# Patient Record
Sex: MALE
[De-identification: names, ages, dates, MRNs, and addresses within clinical notes are randomized per-mention and may not be internally consistent; named-entity substitution may affect disease eponyms.]

---

## 2021-05-24 ENCOUNTER — APPOINTMENT (OUTPATIENT)
Dept: AFTER HOURS CARE | Facility: EMERGENCY ROOM | Age: 61
End: 2021-05-24
Payer: MEDICARE

## 2021-05-24 DIAGNOSIS — M25.421 EFFUSION, RIGHT ELBOW: ICD-10-CM

## 2021-05-24 PROCEDURE — 99204 OFFICE O/P NEW MOD 45 MIN: CPT | Mod: 95

## 2021-05-25 PROBLEM — M25.421 SWELLING OF RIGHT ELBOW: Status: ACTIVE | Noted: 2021-05-25

## 2021-06-14 NOTE — PLAN
[FreeTextEntry1] : Tylenol 500 mg QID\par Recommended sling for comfort. patient stated he preferred to use an ACE instead, which is reasonable\par Urgent orthopedic surgery f/u\par Patient understands to go to ED immediately should he develop fever/chills, malaise/flu like symptoms, for severe\par pain, or if he notices erythema anywhere on his RUE.

## 2021-06-14 NOTE — HISTORY OF PRESENT ILLNESS
[Other Location: e.g. School (Enter Location, City,State)___] : at [unfilled], at the time of the visit. [Verbal consent obtained from patient] : the patient, [unfilled] [Other Location: e.g. Home (Enter Location, City,State)___] : at [unfilled] [FreeTextEntry8] : R elbow swelling 2 days atraumatic\par Cannot flex/ext\par Elbow appears swollen\par No h/o gout, no f/c, wrist/shoulder pain; able to move all other joints\par RHD\par Ibuprofen x 2 without much relief\par NKDA\par Denies trauma, overuse, immunosuppression, IVDU, h/o DM2\par Meds: cavidolol, losartan, furosimide, plavix\par \par R elbow: No e/o erythema to skin. Joint effusion suggested but exam somewhat limited. ROM ~ 20-90. Appears to\par be have full pronation/supination. No gross olecranon bursitis\par

## 2021-06-14 NOTE — ASSESSMENT
[FreeTextEntry1] : Atraumatic R elbow effusion. Likely inflammatory, less likely hemarthrosis though possible given h/o plavix. Very\par low concern for septic joint

## 2022-01-28 ENCOUNTER — APPOINTMENT (OUTPATIENT)
Dept: CT IMAGING | Facility: CLINIC | Age: 62
End: 2022-01-28

## 2022-03-01 ENCOUNTER — APPOINTMENT (OUTPATIENT)
Dept: CT IMAGING | Facility: CLINIC | Age: 62
End: 2022-03-01
Payer: MEDICARE

## 2022-03-01 ENCOUNTER — OUTPATIENT (OUTPATIENT)
Dept: OUTPATIENT SERVICES | Facility: HOSPITAL | Age: 62
LOS: 1 days | End: 2022-03-01
Payer: COMMERCIAL

## 2022-03-01 DIAGNOSIS — M10.00 IDIOPATHIC GOUT, UNSPECIFIED SITE: ICD-10-CM

## 2022-03-01 PROCEDURE — 73200 CT UPPER EXTREMITY W/O DYE: CPT | Mod: 26,LT

## 2022-03-01 PROCEDURE — 76377 3D RENDER W/INTRP POSTPROCES: CPT | Mod: 26

## 2022-03-01 PROCEDURE — 76377 3D RENDER W/INTRP POSTPROCES: CPT

## 2022-03-01 PROCEDURE — 73200 CT UPPER EXTREMITY W/O DYE: CPT

## 2022-03-02 NOTE — H&P PST ADULT - HISTORY OF PRESENT ILLNESS
Department of Cardiology                                                                  [facility]/[place] [address] [address] [facility]                                                            Telephone: [phone]. Fax:[phone]                                                                                     Pre-BRANDON Note        Narrative:      62 yo male GAY, COPD, ETOH abuse, cocaine abuse, severe NICM with normalization of LV, HTN, HLD, PAF with prior ANI thrombus, severe VHD s/p mitral and tricuspid valve repair with ANI closure, atrial flutter s/p cardioversion 12/2020 and ablation/ANI ligation 4/2021, CAD s/p DHARA to LCx 2/2021 with Dr. Archer. He has loop recorder which has been stable.He is followed by Dr. Koch and here for BRANDON to assess ANI post op for leak.    ASA and Mallampati: Per Anesthesia    	  MEDICATIONS:  Allopurinol 100 mg daily  ASA 81 mg daily  Atorvastatin 40 mg daily  Plavix 75 mg daily  Lasix 20 mg daily  Losartan 50 mg daily  Metoprolol tartrate 25 mg daily  Percocet  Potassium 20 meq daily                    PHYSICAL EXAM:    T(C): --  HR: --  BP: --  RR: --  SpO2: --  Wt(kg): --    I&O's Summary      Daily     Daily     Constitutional: A & O x 3  HEENT:   Normal oral mucosa, PERRL, EOMI	  Cardiovascular: Normal S1 S2, No JVD, No murmurs, No edema  Respiratory: Lungs clear to auscultation	  Gastrointestinal:  Soft, Non-tender, + BS	  Skin: No rashes, No ecchymoses, No cyanosis  Neurologic: Non-focal  Extremities: Normal range of motion, No clubbing, cyanosis or edema  Vascular: Peripheral pulses palpable 2+ bilaterally    TELEMETRY: 	      ECG:  	    Diagnsotics:    Echo 4/9/21  EF 55%    LABS:	 	    CARDIAC MARKERS:                    proBNP:   Lipid Profile:   HgA1c:   TSH:     ASSESSMENT:    -BRANDON as ordered  -Labs and ECG reviewed  -Procedure discussed with patient; risks and benefits explained; questions answered  -Consent obtained by Echocardiographer and anesthesiologist                                                                         Department of Cardiology                                                                  [facility]/[place] [address] [address] [facility]                                                            Telephone: [phone]. Fax:[phone]                                                                                     Pre-BRANDON Note        Narrative:      63 yo male GAY, COPD, ETOH abuse, cocaine abuse, severe NICM with normalization of LV, HTN, HLD, PAF with prior ANI thrombus, severe VHD s/p mitral and tricuspid valve repair with ANI closure, atrial flutter s/p cardioversion 12/2020 and ablation/ANI ligation 4/2021, CAD s/p DHARA to LCx 2/2021 with Dr. Archer. He has loop recorder which has been stable. He is followed by Dr. Koch and here for BRANDON to assess ANI post op for leak.    ASA and Mallampati: Per Anesthesia    	  MEDICATIONS:  Allopurinol 100 mg daily  ASA 81 mg daily  Atorvastatin 40 mg daily  Plavix 75 mg daily  Lasix 20 mg daily  Losartan 50 mg daily  Metoprolol tartrate 25 mg daily  Percocet  Potassium 20 meq daily            Vital Signs Last 24 Hrs  T(C): 36.6 (03 Mar 2022 07:43), Max: 36.6 (03 Mar 2022 07:43)  T(F): 97.8 (03 Mar 2022 07:43), Max: 97.8 (03 Mar 2022 07:43)  HR: 82 (03 Mar 2022 07:43) (82 - 82)  BP: 166/102 (03 Mar 2022 07:43) (166/102 - 166/102)  BP(mean): --  RR: 16 (03 Mar 2022 07:43) (16 - 16)  SpO2: 96% (03 Mar 2022 07:43) (96% - 96%)        PHYSICAL EXAM:    Constitutional: A & O x 3  HEENT:   Normal oral mucosa, PERRL, EOMI	  Cardiovascular: Normal S1 S2, No JVD, No murmurs, No edema  Respiratory: Lungs clear to auscultation	  Gastrointestinal:  Soft, Non-tender, + BS	  Skin: No rashes, No ecchymoses, No cyanosis  Neurologic: Non-focal  Extremities: Normal range of motion, No clubbing, cyanosis or edema  Vascular: Peripheral pulses palpable 2+ bilaterally    TELEMETRY: SR	      ECG:  SR 85bpm without any ST changes    Diagnsotics:    Echo 4/9/21  EF 55%    LABS:	 	                          12.8   5.38  )-----------( 205      ( 03 Mar 2022 07:41 )             38.3       03-03    140  |  98  |  14.1  ----------------------------<  107<H>  3.8   |  30.0<H>  |  1.06    Ca    9.2      03 Mar 2022 07:41    TPro  7.3  /  Alb  4.2  /  TBili  0.7  /  DBili  x   /  AST  33  /  ALT  13  /  AlkPhos  181<H>  03-03                  proBNP:   Lipid Profile:   HgA1c:   TSH:     ASSESSMENT: 63 yo M with H/O MVr/TVr, ANI thrombus S/P closure presents for BRANDON to assess ANI post op for possible leak    -BRANDON as ordered  -Labs and ECG reviewed  -Procedure discussed with patient; risks and benefits explained; questions answered  -Consent obtained by Echocardiographer and anesthesiologist

## 2022-03-02 NOTE — H&P PST ADULT - NSICDXPASTSURGICALHX_GEN_ALL_CORE_FT
PAST SURGICAL HISTORY:  H/O knee surgery     S/P mitral valve repair     S/P tricuspid valve repair

## 2022-03-02 NOTE — H&P PST ADULT - NSICDXPASTMEDICALHX_GEN_ALL_CORE_FT
PAST MEDICAL HISTORY:  Afib     CAD (coronary artery disease)     COPD without exacerbation     GERD (gastroesophageal reflux disease)     Gout     HLD (hyperlipidemia)     HTN (hypertension)     NICM (nonischemic cardiomyopathy)     GAY (obstructive sleep apnea)

## 2022-03-03 ENCOUNTER — TRANSCRIPTION ENCOUNTER (OUTPATIENT)
Age: 62
End: 2022-03-03

## 2022-03-03 ENCOUNTER — OUTPATIENT (OUTPATIENT)
Dept: OUTPATIENT SERVICES | Facility: HOSPITAL | Age: 62
LOS: 1 days | End: 2022-03-03
Payer: MEDICARE

## 2022-03-03 VITALS
RESPIRATION RATE: 16 BRPM | HEART RATE: 82 BPM | OXYGEN SATURATION: 96 % | SYSTOLIC BLOOD PRESSURE: 166 MMHG | TEMPERATURE: 98 F | DIASTOLIC BLOOD PRESSURE: 102 MMHG

## 2022-03-03 VITALS — SYSTOLIC BLOOD PRESSURE: 156 MMHG | DIASTOLIC BLOOD PRESSURE: 99 MMHG

## 2022-03-03 DIAGNOSIS — Z98.890 OTHER SPECIFIED POSTPROCEDURAL STATES: Chronic | ICD-10-CM

## 2022-03-03 DIAGNOSIS — I48.91 UNSPECIFIED ATRIAL FIBRILLATION: ICD-10-CM

## 2022-03-03 LAB
ALBUMIN SERPL ELPH-MCNC: 4.2 G/DL — SIGNIFICANT CHANGE UP (ref 3.3–5.2)
ALP SERPL-CCNC: 181 U/L — HIGH (ref 40–120)
ALT FLD-CCNC: 13 U/L — SIGNIFICANT CHANGE UP
ANION GAP SERPL CALC-SCNC: 12 MMOL/L — SIGNIFICANT CHANGE UP (ref 5–17)
AST SERPL-CCNC: 33 U/L — SIGNIFICANT CHANGE UP
BASOPHILS # BLD AUTO: 0.04 K/UL — SIGNIFICANT CHANGE UP (ref 0–0.2)
BASOPHILS NFR BLD AUTO: 0.7 % — SIGNIFICANT CHANGE UP (ref 0–2)
BILIRUB SERPL-MCNC: 0.7 MG/DL — SIGNIFICANT CHANGE UP (ref 0.4–2)
BUN SERPL-MCNC: 14.1 MG/DL — SIGNIFICANT CHANGE UP (ref 8–20)
CALCIUM SERPL-MCNC: 9.2 MG/DL — SIGNIFICANT CHANGE UP (ref 8.6–10.2)
CHLORIDE SERPL-SCNC: 98 MMOL/L — SIGNIFICANT CHANGE UP (ref 98–107)
CO2 SERPL-SCNC: 30 MMOL/L — HIGH (ref 22–29)
CREAT SERPL-MCNC: 1.06 MG/DL — SIGNIFICANT CHANGE UP (ref 0.5–1.3)
EGFR: 79 ML/MIN/1.73M2 — SIGNIFICANT CHANGE UP
EOSINOPHIL # BLD AUTO: 0.18 K/UL — SIGNIFICANT CHANGE UP (ref 0–0.5)
EOSINOPHIL NFR BLD AUTO: 3.3 % — SIGNIFICANT CHANGE UP (ref 0–6)
GLUCOSE SERPL-MCNC: 107 MG/DL — HIGH (ref 70–99)
HCT VFR BLD CALC: 38.3 % — LOW (ref 39–50)
HGB BLD-MCNC: 12.8 G/DL — LOW (ref 13–17)
IMM GRANULOCYTES NFR BLD AUTO: 0.2 % — SIGNIFICANT CHANGE UP (ref 0–1.5)
LYMPHOCYTES # BLD AUTO: 1.18 K/UL — SIGNIFICANT CHANGE UP (ref 1–3.3)
LYMPHOCYTES # BLD AUTO: 21.9 % — SIGNIFICANT CHANGE UP (ref 13–44)
MCHC RBC-ENTMCNC: 32 PG — SIGNIFICANT CHANGE UP (ref 27–34)
MCHC RBC-ENTMCNC: 33.4 GM/DL — SIGNIFICANT CHANGE UP (ref 32–36)
MCV RBC AUTO: 95.8 FL — SIGNIFICANT CHANGE UP (ref 80–100)
MONOCYTES # BLD AUTO: 0.42 K/UL — SIGNIFICANT CHANGE UP (ref 0–0.9)
MONOCYTES NFR BLD AUTO: 7.8 % — SIGNIFICANT CHANGE UP (ref 2–14)
NEUTROPHILS # BLD AUTO: 3.55 K/UL — SIGNIFICANT CHANGE UP (ref 1.8–7.4)
NEUTROPHILS NFR BLD AUTO: 66.1 % — SIGNIFICANT CHANGE UP (ref 43–77)
PLATELET # BLD AUTO: 205 K/UL — SIGNIFICANT CHANGE UP (ref 150–400)
POTASSIUM SERPL-MCNC: 3.8 MMOL/L — SIGNIFICANT CHANGE UP (ref 3.5–5.3)
POTASSIUM SERPL-SCNC: 3.8 MMOL/L — SIGNIFICANT CHANGE UP (ref 3.5–5.3)
PROT SERPL-MCNC: 7.3 G/DL — SIGNIFICANT CHANGE UP (ref 6.6–8.7)
RBC # BLD: 4 M/UL — LOW (ref 4.2–5.8)
RBC # FLD: 14.1 % — SIGNIFICANT CHANGE UP (ref 10.3–14.5)
SODIUM SERPL-SCNC: 140 MMOL/L — SIGNIFICANT CHANGE UP (ref 135–145)
WBC # BLD: 5.38 K/UL — SIGNIFICANT CHANGE UP (ref 3.8–10.5)
WBC # FLD AUTO: 5.38 K/UL — SIGNIFICANT CHANGE UP (ref 3.8–10.5)

## 2022-03-03 PROCEDURE — 85025 COMPLETE CBC W/AUTO DIFF WBC: CPT

## 2022-03-03 PROCEDURE — 80053 COMPREHEN METABOLIC PANEL: CPT

## 2022-03-03 PROCEDURE — 93325 DOPPLER ECHO COLOR FLOW MAPG: CPT

## 2022-03-03 PROCEDURE — 93010 ELECTROCARDIOGRAM REPORT: CPT

## 2022-03-03 PROCEDURE — 93320 DOPPLER ECHO COMPLETE: CPT

## 2022-03-03 PROCEDURE — 93312 ECHO TRANSESOPHAGEAL: CPT

## 2022-03-03 PROCEDURE — 36415 COLL VENOUS BLD VENIPUNCTURE: CPT

## 2022-03-03 PROCEDURE — 93005 ELECTROCARDIOGRAM TRACING: CPT

## 2022-03-03 RX ORDER — POTASSIUM CHLORIDE 20 MEQ
1 PACKET (EA) ORAL
Qty: 0 | Refills: 0 | DISCHARGE

## 2022-03-03 RX ORDER — FUROSEMIDE 40 MG
1 TABLET ORAL
Qty: 0 | Refills: 0 | DISCHARGE

## 2022-03-03 RX ORDER — LOSARTAN POTASSIUM 100 MG/1
1 TABLET, FILM COATED ORAL
Qty: 0 | Refills: 0 | DISCHARGE

## 2022-03-03 RX ORDER — COLCHICINE 0.6 MG
1 TABLET ORAL
Qty: 0 | Refills: 0 | DISCHARGE

## 2022-03-03 RX ORDER — METOPROLOL TARTRATE 50 MG
1 TABLET ORAL
Qty: 0 | Refills: 0 | DISCHARGE

## 2022-03-03 RX ORDER — ATORVASTATIN CALCIUM 80 MG/1
1 TABLET, FILM COATED ORAL
Qty: 0 | Refills: 0 | DISCHARGE

## 2022-03-03 RX ORDER — ASPIRIN/CALCIUM CARB/MAGNESIUM 324 MG
1 TABLET ORAL
Qty: 0 | Refills: 0 | DISCHARGE

## 2022-03-03 RX ORDER — ALLOPURINOL 300 MG
1 TABLET ORAL
Qty: 0 | Refills: 0 | DISCHARGE

## 2022-03-03 NOTE — DISCHARGE NOTE PROVIDER - NSDCMRMEDTOKEN_GEN_ALL_CORE_FT
allopurinol 300 mg oral tablet: 1 tab(s) orally once a day  aspirin 81 mg oral tablet, chewable: 1 tab(s) orally once a day  atorvastatin 40 mg oral tablet: 1 tab(s) orally once a day  colchicine 0.6 mg oral tablet: 1 tab(s) orally once a day  furosemide 20 mg oral tablet: 1 tab(s) orally once a day  losartan 50 mg oral tablet: 1 tab(s) orally once a day  Metoprolol Tartrate 25 mg oral tablet: 1 tab(s) orally 2 times a day  potassium chloride 20 mEq oral tablet, extended release: 1 tab(s) orally 2 times a day

## 2022-03-03 NOTE — DISCHARGE NOTE NURSING/CASE MANAGEMENT/SOCIAL WORK - NSDCPEFALRISK_GEN_ALL_CORE
For information on Fall & Injury Prevention, visit: [email or web address] OR  [email or web address] OR  https://www.cdc.gov/steadi/patient.html

## 2022-03-03 NOTE — DISCHARGE NOTE PROVIDER - NSDCCPTREATMENT_GEN_ALL_CORE_FT
PRINCIPAL PROCEDURE  Procedure: Complete transesophageal echocardiography (BRANDON) with color flow Doppler imaging  Findings and Treatment: No sig mitral or tricuspid regurgitation; ANI ligation without leak

## 2022-03-03 NOTE — DISCHARGE NOTE NURSING/CASE MANAGEMENT/SOCIAL WORK - PATIENT PORTAL LINK FT
You can access the FollowMyHealth Patient Portal offered by [facility] by registering at the following website: [email or web address]. By joining [facility]’s FollowMyHealth portal, you will also be able to view your health information using other applications (apps) compatible with our system.

## 2022-03-03 NOTE — DISCHARGE NOTE PROVIDER - CARE PROVIDER_API CALL
Tc Gorman)  Cardiology; Internal Medicine  [address] [place] [address]  Phone: [phone]  Fax: [phone]  Follow Up Time: 2 weeks

## 2022-03-03 NOTE — DISCHARGE NOTE PROVIDER - HOSPITAL COURSE
63 yo male GAY, COPD, ETOH abuse, cocaine abuse, severe NICM with normalization of LV, HTN, HLD, PAF with prior ANI thrombus, severe VHD s/p mitral and tricuspid valve repair with ANI closure, atrial flutter s/p cardioversion 12/2020 and ablation/ANI ligation 4/2021, CAD s/p DHARA to LCx 2/2021 with Dr. Archer. He has loop recorder which has been stable. He is followed by Dr. Koch and here for BRANDON to assess ANI post op for leak.  S/P BRANDON with Dr Gorman which did not demonstrate and sig MR or TR, nor ANI thrombus  Tolerated the procedure well	  Stable for discharge home

## 2022-03-03 NOTE — ASU PATIENT PROFILE, ADULT - FALL HARM RISK - UNIVERSAL INTERVENTIONS
Bed in lowest position, wheels locked, appropriate side rails in place/Call bell, personal items and telephone in reach/Instruct patient to call for assistance before getting out of bed or chair/Non-slip footwear when patient is out of bed/[place] to call system/Physically safe environment - no spills, clutter or unnecessary equipment/Purposeful Proactive Rounding/Room/bathroom lighting operational, light cord in reach

## 2022-03-03 NOTE — PROGRESS NOTE ADULT - SUBJECTIVE AND OBJECTIVE BOX
Post-Procedure Note: BRANDON    61 yo male GAY, COPD, ETOH abuse, cocaine abuse, severe NICM with normalization of LV, HTN, HLD, PAF with prior ANI thrombus, severe VHD s/p mitral and tricuspid valve repair with ANI closure, atrial flutter s/p cardioversion 12/2020 and ablation/ANI ligation 4/2021, CAD s/p DHARA to LCx 2/2021 with Dr. Archer. He has loop recorder which has been stable. He is followed by Dr. Koch and here for BRANDON to assess ANI post op for leak.  S/P BRANDON with Dr Gorman which did not demonstrate and sig MR or TR, nor ANI thrombus  Tolerated the procedure well      T(C): 36.6 (03-03-22 @ 07:43), Max: 36.6 (03-03-22 @ 07:43)  HR: 82 (03-03-22 @ 07:43) (82 - 82)  BP: 156/99 (03-03-22 @ 08:00) (156/99 - 166/102)  RR: 16 (03-03-22 @ 07:43) (16 - 16)  SpO2: 96% (03-03-22 @ 07:43) (96% - 96%)  Wt(kg): --      Constitutional: A & O x 3  HEENT:   Normal oral mucosa, PERRL, EOMI	  Cardiovascular: Normal S1 S2, No JVD, No murmurs, No edema  Respiratory: Lungs clear to auscultation	  Gastrointestinal:  Soft, Non-tender, + BS	  Skin: No rashes, No ecchymoses, No cyanosis  Neurologic: Non-focal  Extremities: Normal range of motion, No clubbing, cyanosis or edema  Vascular: Peripheral pulses palpable 2+ bilaterally        DIAGNOSTIC TESTING:  [ ] Echocardiogram:     ASSESSMENT: 61 y/o M S/P MVr/TVr, ANI closure S/P BRANDON    -Post BRANDON orders  -preliminary verbal report; centricity pending  -Cont medical therapy  -Follow up with Dr Gorman as outpt  -DC home after recovery period completed                                                                                                                                               Post-Procedure Note: BRANDON    63 yo male GAY, COPD, ETOH abuse, cocaine abuse, severe NICM with normalization of LV, HTN, HLD, PAF with prior ANI thrombus, severe VHD s/p mitral and tricuspid valve repair with ANI closure, atrial flutter s/p cardioversion 12/2020 and ablation/ANI ligation 4/2021, CAD s/p DHARA to LCx 2/2021 with Dr. Archer. He has loop recorder which has been stable. He is followed by Dr. Koch and here for BRANDON to assess ANI post op for leak.  S/P BRANDON with Dr Gorman which did not demonstrate and sig MR or TR, nor ANI thrombus  Tolerated the procedure well    < from: BRANDON Echo Doppler (03.03.22 @ 09:31) >  ACC: [id number] EXAM:  BRANDON COMP W SPECT AND COLOR#                          PROCEDURE DATE:  03/03/2022          INTERPRETATION:  TRANSESOPHAGEAL ECHOCARDIOGRAM REPORT        Patient Name:   ISA MATAMOROS Patient Location: Outpatient  Medical Rec #:  [id number]   Accession #:      [id number]  Account #:                 Height:           73.0 in 185.4 cm  YOB: 1960  Weight:           210.0 lb 95.26 kg  Patient Age:    62 years   BSA:              2.20 m²  Patient Gender: M          BP:           130/82 mmHg      Date of Exam:        3/3/2022 9:31:13 AM  Sonographer:         Constantin Kim  Referring Physician: LEX    Procedure:   Transesophageal Echocardiogram.  Indications: Other nonrheumatic mitral valve disorders - I34.8  Diagnosis:   Other nonrheumatic mitral valve disorders - I34.8    SPECTRAL DOPPLER ANALYSIS (where applicable):    PHYSICIAN INTERPRETATION:  Left Ventricle:  Left ventricular ejection fraction, by visual estimation, is 50 to 55%.  Right Ventricle: Theright ventricular size is normal. RV systolic   function is normal.  Left Atrium: Left atrial enlargement. Status-post ANI ligation without   any evidence of leak. Intact intra-atrial septum without shunt.  Right Atrium: Mildly enlarged right atrium.  Mitral Valve: Status-post mitral annular ring insertion. Trace mitral   valve regurgitation is seen.  Tricuspid Valve: No tricuspid regurgitation is visualized. Status-post   tricuspid annular repair with normal function.  Aortic Valve: The aortic valve is trileaflet. No evidence of aortic   stenosis. No evidence of aortic valve regurgitation is seen.  Pulmonic Valve: The pulmonic valve was not well visualized.  Aorta: There is mild aortic root calcification.  Shunts: There is no evidence of a patent foramen ovale.      Summary:   1. Status-post ANI ligation without any evidence of leak.   2. Left ventricular ejection fraction, by visual estimation, is 50 to   55%.   3. Left atrial enlargement.   4. Mildly enlarged right atrium.   5. Status-post mitral annular ring insertion.   6. Trace mitral valve regurgitation.   7. Status-post tricuspid annular repair with normal function.   8. Intact intra-atrial septum without shunt.   9. No evidence of aortic stenosis.  10. There is mild aortic root calcification.         T(C): 36.6 (03-03-22 @ 07:43), Max: 36.6 (03-03-22 @ 07:43)  HR: 82 (03-03-22 @ 07:43) (82 - 82)  BP: 156/99 (03-03-22 @ 08:00) (156/99 - 166/102)  RR: 16 (03-03-22 @ 07:43) (16 - 16)  SpO2: 96% (03-03-22 @ 07:43) (96% - 96%)  Wt(kg): --      Constitutional: A & O x 3  HEENT:   Normal oral mucosa, PERRL, EOMI	  Cardiovascular: Normal S1 S2, No JVD, No murmurs, No edema  Respiratory: Lungs clear to auscultation	  Gastrointestinal:  Soft, Non-tender, + BS	  Skin: No rashes, No ecchymoses, No cyanosis  Neurologic: Non-focal  Extremities: Normal range of motion, No clubbing, cyanosis or edema  Vascular: Peripheral pulses palpable 2+ bilaterally        DIAGNOSTIC TESTING:  [ ] Echocardiogram:     ASSESSMENT: 63 y/o M S/P MVr/TVr, ANI closure S/P BRANDON    -Post BRANDON orders  -Cont medical therapy  -Follow up with Dr Lex ford outpt  -DC home after recovery period completed

## 2024-09-25 ENCOUNTER — OFFICE (OUTPATIENT)
Dept: URBAN - METROPOLITAN AREA CLINIC 63 | Facility: CLINIC | Age: 64
Setting detail: OPHTHALMOLOGY
End: 2024-09-25
Payer: MEDICARE

## 2024-09-25 DIAGNOSIS — H52.4: ICD-10-CM

## 2024-09-25 DIAGNOSIS — H35.033: ICD-10-CM

## 2024-09-25 DIAGNOSIS — H25.13: ICD-10-CM

## 2024-09-25 DIAGNOSIS — H02.89: ICD-10-CM

## 2024-09-25 PROCEDURE — 92004 COMPRE OPH EXAM NEW PT 1/>: CPT | Performed by: INTERNAL MEDICINE

## 2024-09-25 PROCEDURE — 92250 FUNDUS PHOTOGRAPHY W/I&R: CPT | Performed by: INTERNAL MEDICINE

## 2024-09-25 ASSESSMENT — CONFRONTATIONAL VISUAL FIELD TEST (CVF)
OS_FINDINGS: FULL
OD_FINDINGS: FULL

## 2025-03-04 PROBLEM — E78.5 HYPERLIPIDEMIA, UNSPECIFIED: Chronic | Status: ACTIVE | Noted: 2022-03-03

## 2025-03-04 PROBLEM — K21.9 GASTRO-ESOPHAGEAL REFLUX DISEASE WITHOUT ESOPHAGITIS: Chronic | Status: ACTIVE | Noted: 2022-03-03

## 2025-03-04 PROBLEM — J44.9 CHRONIC OBSTRUCTIVE PULMONARY DISEASE, UNSPECIFIED: Chronic | Status: ACTIVE | Noted: 2022-03-03

## 2025-03-04 PROBLEM — I42.8 OTHER CARDIOMYOPATHIES: Chronic | Status: ACTIVE | Noted: 2022-03-03

## 2025-03-04 PROBLEM — I10 ESSENTIAL (PRIMARY) HYPERTENSION: Chronic | Status: ACTIVE | Noted: 2022-03-03

## 2025-03-04 PROBLEM — M10.9 GOUT, UNSPECIFIED: Chronic | Status: ACTIVE | Noted: 2022-03-03

## 2025-03-04 PROBLEM — I25.10 ATHEROSCLEROTIC HEART DISEASE OF NATIVE CORONARY ARTERY WITHOUT ANGINA PECTORIS: Chronic | Status: ACTIVE | Noted: 2022-03-03

## 2025-03-04 PROBLEM — I48.91 UNSPECIFIED ATRIAL FIBRILLATION: Chronic | Status: ACTIVE | Noted: 2022-03-03

## 2025-03-04 PROBLEM — G47.33 OBSTRUCTIVE SLEEP APNEA (ADULT) (PEDIATRIC): Chronic | Status: ACTIVE | Noted: 2022-03-03

## 2025-03-26 ENCOUNTER — RX ONLY (RX ONLY)
Age: 65
End: 2025-03-26

## 2025-03-26 ENCOUNTER — OFFICE (OUTPATIENT)
Dept: URBAN - METROPOLITAN AREA CLINIC 63 | Facility: CLINIC | Age: 65
Setting detail: OPHTHALMOLOGY
End: 2025-03-26
Payer: MEDICARE

## 2025-03-26 DIAGNOSIS — H02.834: ICD-10-CM

## 2025-03-26 DIAGNOSIS — H35.033: ICD-10-CM

## 2025-03-26 DIAGNOSIS — H02.89: ICD-10-CM

## 2025-03-26 DIAGNOSIS — H02.831: ICD-10-CM

## 2025-03-26 DIAGNOSIS — H16.223: ICD-10-CM

## 2025-03-26 DIAGNOSIS — H25.813: ICD-10-CM

## 2025-03-26 PROCEDURE — 92014 COMPRE OPH EXAM EST PT 1/>: CPT | Performed by: INTERNAL MEDICINE

## 2025-03-26 ASSESSMENT — REFRACTION_MANIFEST
OS_AXIS: 065
OU_VA: 20/20
OD_CYLINDER: -0.50
OS_SPHERE: PLANO
OD_VA2: 20/20(J1+)
OS_CYLINDER: -1.00
OS_VA2: 20/20(J1+)
OS_VA1: 20/20
OS_SPHERE: 0.00
OD_SPHERE: -2.50
OD_VA1: 20/50
OS_CYLINDER: -1.00
OD_CYLINDER: -0.75
OD_ADD: +2.25
OD_SPHERE: -2.50
OS_AXIS: 065
OD_VA1: 20/25-1
OS_ADD: +2.25
OS_VA1: 20/20
OD_AXIS: 085
OD_AXIS: 090

## 2025-03-26 ASSESSMENT — SUPERFICIAL PUNCTATE KERATITIS (SPK)
OS_SPK: 1+
OD_SPK: 1+

## 2025-03-26 ASSESSMENT — REFRACTION_AUTOREFRACTION
OD_SPHERE: -2.50
OS_SPHERE: +0.25
OD_CYLINDER: -0.75
OS_CYLINDER: -1.00
OD_AXIS: 093
OS_AXIS: 067

## 2025-03-26 ASSESSMENT — LID POSITION - DERMATOCHALASIS
OS_DERMATOCHALASIS: LUL 2+
OD_DERMATOCHALASIS: RUL 2+

## 2025-03-26 ASSESSMENT — VISUAL ACUITY
OD_BCVA: 20/20-1
OS_BCVA: 20/300

## 2025-03-26 ASSESSMENT — KERATOMETRY
OD_K1POWER_DIOPTERS: 42.75
OS_K2POWER_DIOPTERS: 42.25
OD_AXISANGLE_DEGREES: 173
OS_K1POWER_DIOPTERS: 42.00
OD_K2POWER_DIOPTERS: 43.00
OS_AXISANGLE_DEGREES: 154

## 2025-03-26 ASSESSMENT — CONFRONTATIONAL VISUAL FIELD TEST (CVF)
OS_FINDINGS: FULL
OD_FINDINGS: FULL

## 2025-03-26 ASSESSMENT — TONOMETRY
OD_IOP_MMHG: 20
OD_IOP_MMHG: 19
OS_IOP_MMHG: 19

## 2025-04-17 ENCOUNTER — APPOINTMENT (OUTPATIENT)
Dept: PULMONOLOGY | Facility: CLINIC | Age: 65
End: 2025-04-17
Payer: MEDICARE

## 2025-04-17 VITALS
HEIGHT: 69.75 IN | BODY MASS INDEX: 32.87 KG/M2 | DIASTOLIC BLOOD PRESSURE: 68 MMHG | OXYGEN SATURATION: 97 % | WEIGHT: 227 LBS | RESPIRATION RATE: 16 BRPM | SYSTOLIC BLOOD PRESSURE: 116 MMHG | HEART RATE: 62 BPM

## 2025-04-17 DIAGNOSIS — E66.9 OBESITY, UNSPECIFIED: ICD-10-CM

## 2025-04-17 DIAGNOSIS — I10 ESSENTIAL (PRIMARY) HYPERTENSION: ICD-10-CM

## 2025-04-17 DIAGNOSIS — G47.33 OBSTRUCTIVE SLEEP APNEA (ADULT) (PEDIATRIC): ICD-10-CM

## 2025-04-17 DIAGNOSIS — E78.00 PURE HYPERCHOLESTEROLEMIA, UNSPECIFIED: ICD-10-CM

## 2025-04-17 PROCEDURE — G2211 COMPLEX E/M VISIT ADD ON: CPT

## 2025-04-17 PROCEDURE — 99204 OFFICE O/P NEW MOD 45 MIN: CPT

## 2025-04-17 RX ORDER — FUROSEMIDE 80 MG/1
TABLET ORAL
Refills: 0 | Status: ACTIVE | COMMUNITY

## 2025-04-17 RX ORDER — LOSARTAN POTASSIUM 100 MG/1
TABLET, FILM COATED ORAL
Refills: 0 | Status: ACTIVE | COMMUNITY

## 2025-04-17 RX ORDER — ATORVASTATIN CALCIUM 80 MG/1
TABLET, FILM COATED ORAL
Refills: 0 | Status: ACTIVE | COMMUNITY

## 2025-04-23 ENCOUNTER — OFFICE (OUTPATIENT)
Dept: URBAN - METROPOLITAN AREA CLINIC 63 | Facility: CLINIC | Age: 65
Setting detail: OPHTHALMOLOGY
End: 2025-04-23
Payer: MEDICARE

## 2025-04-23 DIAGNOSIS — H02.834: ICD-10-CM

## 2025-04-23 DIAGNOSIS — H02.831: ICD-10-CM

## 2025-04-23 DIAGNOSIS — H16.223: ICD-10-CM

## 2025-04-23 DIAGNOSIS — H25.813: ICD-10-CM

## 2025-04-23 DIAGNOSIS — H25.811: ICD-10-CM

## 2025-04-23 PROCEDURE — 92136 OPHTHALMIC BIOMETRY: CPT | Mod: RT | Performed by: INTERNAL MEDICINE

## 2025-04-23 PROCEDURE — 99213 OFFICE O/P EST LOW 20 MIN: CPT | Performed by: INTERNAL MEDICINE

## 2025-04-23 ASSESSMENT — KERATOMETRY
OD_AXISANGLE_DEGREES: 106
OS_AXISANGLE_DEGREES: 52
OS_AXISANGLE_DEGREES: 142
OD_AXISANGLE2_DEGREES: 016
OD_K1K2_AVERAGE: 42.625
OS_CYLPOWER_DEGREES: 0.5
OD_K1POWER_DIOPTERS: 42.50
OD_K2POWER_DIOPTERS: 42.75
OD_CYLAXISANGLE_DEGREES: 016
OS_CYLAXISANGLE_DEGREES: 142
OS_K1POWER_DIOPTERS: 42.50
OS_K2POWER_DIOPTERS: 43.00
OS_K2POWER_DIOPTERS: 43.00
OS_AXISANGLE2_DEGREES: 142
OD_K2POWER_DIOPTERS: 42.75
OD_CYLPOWER_DEGREES: 0.25
OD_K1POWER_DIOPTERS: 42.50
OS_K1POWER_DIOPTERS: 42.50
OS_K1K2_AVERAGE: 42.75
OD_AXISANGLE_DEGREES: 016

## 2025-04-23 ASSESSMENT — LID POSITION - DERMATOCHALASIS
OD_DERMATOCHALASIS: RUL 2+
OS_DERMATOCHALASIS: LUL 2+

## 2025-04-23 ASSESSMENT — REFRACTION_MANIFEST
OS_AXIS: 065
OD_AXIS: 090
OS_ADD: +2.25
OS_VA1: 20/20
OS_CYLINDER: -1.00
OD_VA1: 20/50
OS_AXIS: 065
OS_SPHERE: PLANO
OD_CYLINDER: -0.50
OS_CYLINDER: -1.00
OD_VA2: 20/20(J1+)
OD_VA1: 20/25-1
OS_VA2: 20/20(J1+)
OS_SPHERE: 0.00
OD_CYLINDER: -0.75
OS_VA1: 20/20
OD_ADD: +2.25
OU_VA: 20/20
OD_SPHERE: -2.50
OD_SPHERE: -2.50
OD_AXIS: 085

## 2025-04-23 ASSESSMENT — VISUAL ACUITY
OD_BCVA: 20/30
OS_BCVA: 20/400

## 2025-04-23 ASSESSMENT — REFRACTION_AUTOREFRACTION
OS_CYLINDER: -0.50
OD_AXIS: 088
OD_CYLINDER: -1.00
OD_SPHERE: -2.50
OS_AXIS: 060
OS_SPHERE: -0.25

## 2025-04-23 ASSESSMENT — SUPERFICIAL PUNCTATE KERATITIS (SPK)
OD_SPK: 1+
OS_SPK: 1+

## 2025-04-23 ASSESSMENT — TONOMETRY: OD_IOP_MMHG: 18

## 2025-04-23 ASSESSMENT — CONFRONTATIONAL VISUAL FIELD TEST (CVF)
OS_FINDINGS: FULL
OD_FINDINGS: FULL

## 2025-06-03 ENCOUNTER — OUTPATIENT (OUTPATIENT)
Dept: OUTPATIENT SERVICES | Facility: HOSPITAL | Age: 65
LOS: 1 days | End: 2025-06-03
Payer: MEDICARE

## 2025-06-03 DIAGNOSIS — Z98.890 OTHER SPECIFIED POSTPROCEDURAL STATES: Chronic | ICD-10-CM

## 2025-06-03 DIAGNOSIS — G47.33 OBSTRUCTIVE SLEEP APNEA (ADULT) (PEDIATRIC): ICD-10-CM

## 2025-06-03 PROCEDURE — 95810 POLYSOM 6/> YRS 4/> PARAM: CPT

## 2025-06-03 PROCEDURE — 95811 POLYSOM 6/>YRS CPAP 4/> PARM: CPT | Mod: 26

## 2025-06-24 ENCOUNTER — APPOINTMENT (OUTPATIENT)
Dept: PULMONOLOGY | Facility: CLINIC | Age: 65
End: 2025-06-24
Payer: MEDICARE

## 2025-06-24 VITALS
SYSTOLIC BLOOD PRESSURE: 118 MMHG | WEIGHT: 225 LBS | DIASTOLIC BLOOD PRESSURE: 72 MMHG | RESPIRATION RATE: 16 BRPM | BODY MASS INDEX: 29.82 KG/M2 | OXYGEN SATURATION: 97 % | HEART RATE: 68 BPM | HEIGHT: 73 IN

## 2025-06-24 PROCEDURE — 99214 OFFICE O/P EST MOD 30 MIN: CPT

## 2025-06-24 PROCEDURE — G2211 COMPLEX E/M VISIT ADD ON: CPT

## 2025-06-24 RX ORDER — ALLOPURINOL 200 MG/1
TABLET ORAL
Refills: 0 | Status: ACTIVE | COMMUNITY

## 2025-06-24 RX ORDER — ASPIRIN 81 MG
81 TABLET,CHEWABLE ORAL
Refills: 0 | Status: ACTIVE | COMMUNITY

## 2025-06-25 ENCOUNTER — OFFICE (OUTPATIENT)
Dept: URBAN - METROPOLITAN AREA CLINIC 104 | Facility: CLINIC | Age: 65
Setting detail: OPHTHALMOLOGY
End: 2025-06-25
Payer: MEDICARE

## 2025-06-25 DIAGNOSIS — Z01.818: ICD-10-CM

## 2025-06-25 DIAGNOSIS — H25.813: ICD-10-CM

## 2025-06-25 PROCEDURE — 99212 OFFICE O/P EST SF 10 MIN: CPT

## 2025-07-02 ENCOUNTER — ASC (OUTPATIENT)
Dept: URBAN - METROPOLITAN AREA SURGERY 8 | Facility: SURGERY | Age: 65
Setting detail: OPHTHALMOLOGY
End: 2025-07-02
Payer: MEDICARE

## 2025-07-02 DIAGNOSIS — H25.11: ICD-10-CM

## 2025-07-02 PROCEDURE — 66984 XCAPSL CTRC RMVL W/O ECP: CPT | Mod: RT | Performed by: INTERNAL MEDICINE

## 2025-07-03 ENCOUNTER — OFFICE (OUTPATIENT)
Dept: URBAN - METROPOLITAN AREA CLINIC 63 | Facility: CLINIC | Age: 65
Setting detail: OPHTHALMOLOGY
End: 2025-07-03
Payer: MEDICARE

## 2025-07-03 DIAGNOSIS — Z96.1: ICD-10-CM

## 2025-07-03 PROCEDURE — 99024 POSTOP FOLLOW-UP VISIT: CPT | Performed by: INTERNAL MEDICINE

## 2025-07-03 ASSESSMENT — VISUAL ACUITY
OD_BCVA: 20/25-1
OS_BCVA: 20/30

## 2025-07-03 ASSESSMENT — SUPERFICIAL PUNCTATE KERATITIS (SPK)
OD_SPK: 1+
OS_SPK: 1+

## 2025-07-03 ASSESSMENT — CONFRONTATIONAL VISUAL FIELD TEST (CVF)
OD_FINDINGS: FULL
OS_FINDINGS: FULL

## 2025-07-03 ASSESSMENT — TONOMETRY
OS_IOP_MMHG: 15
OD_IOP_MMHG: 15

## 2025-07-03 ASSESSMENT — LID POSITION - DERMATOCHALASIS
OS_DERMATOCHALASIS: LUL 2+
OD_DERMATOCHALASIS: RUL 2+

## 2025-07-03 ASSESSMENT — CORNEAL EDEMA - FOLDS/STRIAE: OD_FOLDSSTRIAE: T

## 2025-07-08 ENCOUNTER — NON-APPOINTMENT (OUTPATIENT)
Age: 65
End: 2025-07-08

## 2025-07-10 ENCOUNTER — APPOINTMENT (OUTPATIENT)
Dept: OTOLARYNGOLOGY | Facility: CLINIC | Age: 65
End: 2025-07-10
Payer: MEDICARE

## 2025-07-10 ENCOUNTER — OFFICE (OUTPATIENT)
Dept: URBAN - METROPOLITAN AREA CLINIC 63 | Facility: CLINIC | Age: 65
Setting detail: OPHTHALMOLOGY
End: 2025-07-10
Payer: MEDICARE

## 2025-07-10 ENCOUNTER — RX ONLY (RX ONLY)
Age: 65
End: 2025-07-10

## 2025-07-10 VITALS
DIASTOLIC BLOOD PRESSURE: 75 MMHG | BODY MASS INDEX: 29.82 KG/M2 | HEIGHT: 73 IN | HEART RATE: 116 BPM | SYSTOLIC BLOOD PRESSURE: 111 MMHG | WEIGHT: 225 LBS

## 2025-07-10 DIAGNOSIS — H25.812: ICD-10-CM

## 2025-07-10 DIAGNOSIS — Z96.1: ICD-10-CM

## 2025-07-10 PROBLEM — G62.9 NEUROPATHY: Status: ACTIVE | Noted: 2025-07-10

## 2025-07-10 PROBLEM — M06.9 RHEUMATOID ARTHRITIS: Status: ACTIVE | Noted: 2025-07-10

## 2025-07-10 PROBLEM — C61 PROSTATE CA: Status: ACTIVE | Noted: 2025-07-10

## 2025-07-10 PROBLEM — M10.9 GOUT: Status: ACTIVE | Noted: 2025-07-10

## 2025-07-10 PROCEDURE — 99204 OFFICE O/P NEW MOD 45 MIN: CPT

## 2025-07-10 PROCEDURE — 99024 POSTOP FOLLOW-UP VISIT: CPT | Performed by: INTERNAL MEDICINE

## 2025-07-10 RX ORDER — SILDENAFIL 20 MG/1
TABLET ORAL
Refills: 0 | Status: ACTIVE | COMMUNITY

## 2025-07-10 RX ORDER — POTASSIUM CHLORIDE 10 MEQ
CAPSULE, EXTENDED RELEASE ORAL
Refills: 0 | Status: ACTIVE | COMMUNITY

## 2025-07-10 RX ORDER — TEMAZEPAM 22.5 MG/1
CAPSULE ORAL
Refills: 0 | Status: ACTIVE | COMMUNITY

## 2025-07-10 ASSESSMENT — LID POSITION - DERMATOCHALASIS
OS_DERMATOCHALASIS: LUL 2+
OD_DERMATOCHALASIS: RUL 2+

## 2025-07-10 ASSESSMENT — CONFRONTATIONAL VISUAL FIELD TEST (CVF)
OS_FINDINGS: FULL
OD_FINDINGS: FULL

## 2025-07-10 ASSESSMENT — SUPERFICIAL PUNCTATE KERATITIS (SPK)
OS_SPK: 1+
OD_SPK: 1+

## 2025-07-10 ASSESSMENT — VISUAL ACUITY
OS_BCVA: 20/20-
OD_BCVA: 20/30

## 2025-07-10 ASSESSMENT — TONOMETRY
OS_IOP_MMHG: 20
OD_IOP_MMHG: 15

## 2025-07-10 ASSESSMENT — CORNEAL EDEMA - FOLDS/STRIAE: OD_FOLDSSTRIAE: ABSENT

## 2025-07-16 ENCOUNTER — APPOINTMENT (OUTPATIENT)
Dept: NUCLEAR MEDICINE | Facility: CLINIC | Age: 65
End: 2025-07-16
Payer: MEDICARE

## 2025-07-16 ENCOUNTER — OUTPATIENT (OUTPATIENT)
Dept: OUTPATIENT SERVICES | Facility: HOSPITAL | Age: 65
LOS: 1 days | End: 2025-07-16

## 2025-07-16 DIAGNOSIS — Z98.890 OTHER SPECIFIED POSTPROCEDURAL STATES: Chronic | ICD-10-CM

## 2025-07-16 DIAGNOSIS — C61 MALIGNANT NEOPLASM OF PROSTATE: ICD-10-CM

## 2025-07-16 PROCEDURE — 78816 PET IMAGE W/CT FULL BODY: CPT | Mod: 26

## 2025-08-05 ENCOUNTER — OUTPATIENT (OUTPATIENT)
Dept: OUTPATIENT SERVICES | Facility: HOSPITAL | Age: 65
LOS: 1 days | End: 2025-08-05
Payer: MEDICARE

## 2025-08-05 ENCOUNTER — APPOINTMENT (OUTPATIENT)
Dept: RADIOLOGY | Facility: CLINIC | Age: 65
End: 2025-08-05

## 2025-08-05 DIAGNOSIS — Z13.820 ENCOUNTER FOR SCREENING FOR OSTEOPOROSIS: ICD-10-CM

## 2025-08-05 DIAGNOSIS — Z98.890 OTHER SPECIFIED POSTPROCEDURAL STATES: Chronic | ICD-10-CM

## 2025-08-05 PROCEDURE — 77080 DXA BONE DENSITY AXIAL: CPT | Mod: 26

## 2025-08-05 PROCEDURE — 77080 DXA BONE DENSITY AXIAL: CPT

## 2025-08-06 ENCOUNTER — ASC (OUTPATIENT)
Dept: URBAN - METROPOLITAN AREA SURGERY 8 | Facility: SURGERY | Age: 65
Setting detail: OPHTHALMOLOGY
End: 2025-08-06
Payer: MEDICARE

## 2025-08-06 DIAGNOSIS — H52.222: ICD-10-CM

## 2025-08-06 DIAGNOSIS — H25.12: ICD-10-CM

## 2025-08-06 PROCEDURE — 66984 XCAPSL CTRC RMVL W/O ECP: CPT | Mod: 79,LT | Performed by: INTERNAL MEDICINE

## 2025-08-06 PROCEDURE — FEMTO PRECISION LASER CATARACT SURGERY: Mod: GY,LT | Performed by: INTERNAL MEDICINE

## 2025-08-07 ENCOUNTER — OFFICE (OUTPATIENT)
Dept: URBAN - METROPOLITAN AREA CLINIC 63 | Facility: CLINIC | Age: 65
Setting detail: OPHTHALMOLOGY
End: 2025-08-07
Payer: MEDICARE

## 2025-08-07 ENCOUNTER — APPOINTMENT (OUTPATIENT)
Dept: MRI IMAGING | Facility: CLINIC | Age: 65
End: 2025-08-07
Payer: MEDICARE

## 2025-08-07 DIAGNOSIS — Z96.1: ICD-10-CM

## 2025-08-07 PROCEDURE — 99024 POSTOP FOLLOW-UP VISIT: CPT | Performed by: INTERNAL MEDICINE

## 2025-08-07 PROCEDURE — 76498P: CUSTOM

## 2025-08-07 PROCEDURE — A9585: CPT | Mod: JZ

## 2025-08-07 PROCEDURE — 72197 MRI PELVIS W/O & W/DYE: CPT

## 2025-08-07 ASSESSMENT — KERATOMETRY
OS_K2POWER_DIOPTERS: 43.00
OD_AXISANGLE_DEGREES: 090
OS_K1POWER_DIOPTERS: 41.75
OS_AXISANGLE_DEGREES: 117
OD_K2POWER_DIOPTERS: 42.75
OD_K1POWER_DIOPTERS: 42.75

## 2025-08-07 ASSESSMENT — SUPERFICIAL PUNCTATE KERATITIS (SPK)
OD_SPK: 1+
OS_SPK: 1+

## 2025-08-07 ASSESSMENT — TONOMETRY
OD_IOP_MMHG: 19
OS_IOP_MMHG: 19
OD_IOP_MMHG: 18

## 2025-08-07 ASSESSMENT — CORNEAL EDEMA - FOLDS/STRIAE
OD_FOLDSSTRIAE: ABSENT
OS_FOLDSSTRIAE: T

## 2025-08-07 ASSESSMENT — VISUAL ACUITY
OS_BCVA: 20/20-
OD_BCVA: 20/20

## 2025-08-07 ASSESSMENT — LID POSITION - DERMATOCHALASIS
OD_DERMATOCHALASIS: RUL 2+
OS_DERMATOCHALASIS: LUL 2+

## 2025-08-07 ASSESSMENT — REFRACTION_AUTOREFRACTION
OD_AXIS: 088
OD_SPHERE: -0.25
OS_SPHERE: +0.75
OS_AXIS: 051
OD_CYLINDER: -0.50
OS_CYLINDER: -1.25

## 2025-08-07 ASSESSMENT — CONFRONTATIONAL VISUAL FIELD TEST (CVF)
OS_FINDINGS: FULL
OD_FINDINGS: FULL

## 2025-08-13 ENCOUNTER — OFFICE (OUTPATIENT)
Dept: URBAN - METROPOLITAN AREA CLINIC 63 | Facility: CLINIC | Age: 65
Setting detail: OPHTHALMOLOGY
End: 2025-08-13
Payer: MEDICARE

## 2025-08-13 DIAGNOSIS — Z96.1: ICD-10-CM

## 2025-08-13 PROCEDURE — 99024 POSTOP FOLLOW-UP VISIT: CPT | Performed by: INTERNAL MEDICINE

## 2025-08-13 ASSESSMENT — KERATOMETRY
OS_K2POWER_DIOPTERS: 43.00
OD_K1POWER_DIOPTERS: 42.75
OD_K2POWER_DIOPTERS: 42.75
OS_K1POWER_DIOPTERS: 41.75
OD_AXISANGLE_DEGREES: 090
OS_AXISANGLE_DEGREES: 117

## 2025-08-13 ASSESSMENT — REFRACTION_AUTOREFRACTION
OD_SPHERE: -0.25
OS_SPHERE: +0.75
OD_AXIS: 088
OD_CYLINDER: -0.50
OS_AXIS: 051
OS_CYLINDER: -1.25

## 2025-08-13 ASSESSMENT — CONFRONTATIONAL VISUAL FIELD TEST (CVF)
OS_FINDINGS: FULL
OD_FINDINGS: FULL

## 2025-08-13 ASSESSMENT — SUPERFICIAL PUNCTATE KERATITIS (SPK)
OS_SPK: 1+
OD_SPK: 1+

## 2025-08-13 ASSESSMENT — VISUAL ACUITY
OD_BCVA: 20/20
OS_BCVA: 20/20

## 2025-08-13 ASSESSMENT — CORNEAL EDEMA - FOLDS/STRIAE
OS_FOLDSSTRIAE: ABSENT
OD_FOLDSSTRIAE: ABSENT

## 2025-08-13 ASSESSMENT — LID POSITION - DERMATOCHALASIS
OD_DERMATOCHALASIS: RUL 2+
OS_DERMATOCHALASIS: LUL 2+

## 2025-08-13 ASSESSMENT — TONOMETRY
OD_IOP_MMHG: 16
OS_IOP_MMHG: 16

## 2025-09-18 ENCOUNTER — NON-APPOINTMENT (OUTPATIENT)
Age: 65
End: 2025-09-18